# Patient Record
Sex: FEMALE | Race: WHITE
[De-identification: names, ages, dates, MRNs, and addresses within clinical notes are randomized per-mention and may not be internally consistent; named-entity substitution may affect disease eponyms.]

---

## 2021-07-12 ENCOUNTER — HOSPITAL ENCOUNTER (OUTPATIENT)
Dept: HOSPITAL 75 - LAB FS | Age: 31
End: 2021-07-12
Attending: OBSTETRICS & GYNECOLOGY
Payer: MEDICAID

## 2021-07-12 DIAGNOSIS — O20.0: Primary | ICD-10-CM

## 2021-07-12 PROCEDURE — 84702 CHORIONIC GONADOTROPIN TEST: CPT

## 2021-07-12 PROCEDURE — 36415 COLL VENOUS BLD VENIPUNCTURE: CPT

## 2021-07-13 ENCOUNTER — HOSPITAL ENCOUNTER (OUTPATIENT)
Dept: HOSPITAL 75 - RAD | Age: 31
End: 2021-07-13
Attending: OBSTETRICS & GYNECOLOGY
Payer: MEDICAID

## 2021-07-13 DIAGNOSIS — Z3A.08: ICD-10-CM

## 2021-07-13 DIAGNOSIS — O20.9: Primary | ICD-10-CM

## 2021-07-13 PROCEDURE — 76801 OB US < 14 WKS SINGLE FETUS: CPT

## 2021-07-13 PROCEDURE — 76817 TRANSVAGINAL US OBSTETRIC: CPT

## 2021-07-13 NOTE — DIAGNOSTIC IMAGING REPORT
INDICATION: Vaginal bleeding for four days.



FINDINGS: There is an intrauterine pregnancy with fetal pole of

19.8 mm. Gestational age of 8 weeks 4 days. The amniotic sac

appears normal. There is a large heterogeneous subchorionic fluid

collection consistent with hemorrhage. This measures

approximately 5.8 x 2.2 x 5 cm. The maternal adnexa appear

normal. Both ovaries are visualized.



IMPRESSION: Single live intrauterine fetus with current biometric

measurements consistent with 8 week 4 day gestation. Sonographic

EDC of 02/18/2021. This correlates well with LMP. There is a

rather large subchorionic fluid collection present.



Dictated by: 



  Dictated on workstation # [id number]

## 2021-07-14 ENCOUNTER — HOSPITAL ENCOUNTER (EMERGENCY)
Dept: HOSPITAL 75 - ER | Age: 31
Discharge: HOME | End: 2021-07-14
Payer: MEDICAID

## 2021-07-14 VITALS — BODY MASS INDEX: 23.27 KG/M2 | WEIGHT: 128.09 LBS | HEIGHT: 62.01 IN

## 2021-07-14 VITALS — DIASTOLIC BLOOD PRESSURE: 69 MMHG | SYSTOLIC BLOOD PRESSURE: 107 MMHG

## 2021-07-14 DIAGNOSIS — O20.0: Primary | ICD-10-CM

## 2021-07-14 DIAGNOSIS — Z3A.08: ICD-10-CM

## 2021-07-14 LAB
BASOPHILS # BLD AUTO: 0 10^3/UL (ref 0–0.1)
BASOPHILS NFR BLD AUTO: 0 % (ref 0–10)
BUN/CREAT SERPL: 10
CALCIUM SERPL-MCNC: 9.1 MG/DL (ref 8.5–10.1)
CHLORIDE SERPL-SCNC: 104 MMOL/L (ref 98–107)
CO2 SERPL-SCNC: 24 MMOL/L (ref 21–32)
CREAT SERPL-MCNC: 0.83 MG/DL (ref 0.6–1.3)
EOSINOPHIL # BLD AUTO: 0.5 10^3/UL (ref 0–0.3)
EOSINOPHIL NFR BLD AUTO: 4 % (ref 0–10)
GFR SERPLBLD BASED ON 1.73 SQ M-ARVRAT: > 60 ML/MIN
GLUCOSE SERPL-MCNC: 100 MG/DL (ref 70–105)
HCT VFR BLD CALC: 37 % (ref 35–52)
HGB BLD-MCNC: 12.4 G/DL (ref 11.5–16)
LYMPHOCYTES # BLD AUTO: 3.9 10^3/UL (ref 1–4)
LYMPHOCYTES NFR BLD AUTO: 31 % (ref 12–44)
MANUAL DIFFERENTIAL PERFORMED BLD QL: NO
MCH RBC QN AUTO: 30 PG (ref 25–34)
MCHC RBC AUTO-ENTMCNC: 34 G/DL (ref 32–36)
MCV RBC AUTO: 89 FL (ref 80–99)
MONOCYTES # BLD AUTO: 0.8 10^3/UL (ref 0–1)
MONOCYTES NFR BLD AUTO: 6 % (ref 0–12)
NEUTROPHILS # BLD AUTO: 7.3 10^3/UL (ref 1.8–7.8)
NEUTROPHILS NFR BLD AUTO: 59 % (ref 42–75)
PLATELET # BLD: 276 10^3/UL (ref 130–400)
PMV BLD AUTO: 9.7 FL (ref 9–12.2)
POTASSIUM SERPL-SCNC: 3.9 MMOL/L (ref 3.6–5)
SODIUM SERPL-SCNC: 140 MMOL/L (ref 135–145)
WBC # BLD AUTO: 12.5 10^3/UL (ref 4.3–11)

## 2021-07-14 PROCEDURE — 84702 CHORIONIC GONADOTROPIN TEST: CPT

## 2021-07-14 PROCEDURE — 80048 BASIC METABOLIC PNL TOTAL CA: CPT

## 2021-07-14 PROCEDURE — 86900 BLOOD TYPING SEROLOGIC ABO: CPT

## 2021-07-14 PROCEDURE — 85025 COMPLETE CBC W/AUTO DIFF WBC: CPT

## 2021-07-14 PROCEDURE — 86901 BLOOD TYPING SEROLOGIC RH(D): CPT

## 2021-07-14 PROCEDURE — 93041 RHYTHM ECG TRACING: CPT

## 2021-07-14 PROCEDURE — 36415 COLL VENOUS BLD VENIPUNCTURE: CPT

## 2021-07-14 NOTE — ED GU-FEMALE
General


Chief Complaint:  Female Reproductive


Stated Complaint:  VAGINAL BLEEDING/ 8 WEEKS PREGNANT


Source:  patient





History of Present Illness


Date Seen by Provider:  2021


Time Seen by Provider:  20:44


Initial Comments


PT ARRIVES VIA POV FROM HOME IN [place]


PT STATES SHE IS APPROXIMATELY 8 WEEKS PREGNANT WITH LMP 21


HAS NOT SEEN DR. LARIOS FOR THIS PREGNANCY--HAS FIRST APPOINTMENT 21


RX FOR KEFLEX WAS CALLED IN LAST WEEK FOR UTI SYMPTOMS, TOOK FOR 3-4 DAYS, THOSE

SYMPTOMS ARE GONE


PT BEGAN HAVING VAGINAL BLEEDING ON 21, WHILE IN [place], [place] AND WENT 

TO ER THERE. URINE WAS CLEAR AT THAT TIME, SO KEFLEX WAS STOPPED


PT CALLED DR. LARIOS'S OFFICE ON MONDAY AND HAD OUTPATIENT LAB DONE, QUANT BHCG 

WAS [phone], WHICH WAS  HIGHER THAN IT WAS WHEN SHE WAS SEEN IN [place]--WAS [phone] 

ON 21


HAD OUTPATIENT ULTRASOUND DONE YESTERDAY, WHICH SHOWED GEST AGE OF 8 WEEKS, 4 

DAYS ( CONSISTENT WITH LMP) AND LARGE SUBCHORIONIC HEMORRHAGE


STATES BLEEDING HAS CONTINUED TODAY, AND WAS HEAVY EARLIER TODAY, THEN WAS 

SPOTTING, THEN ABOUT AN HOUR AGO, GOT HEAVY AGAIN


HAS BEEN PASSING CLOTS, WITH A COUPLE AS LARGE AS HER PALM


STATES SHE HAS GONE THROUGH 3 BRIEFS TODAY AND ABOUT 10 PADS


EARLIER TODAY, HER BP DROPPED TO 70 SYSTOLIC, THEN WENT BACK UP TO 90/60 WHICH 

IS NORMAL FOR PT


WAS SLIGHTLY DIZZY EARLIER, NOT NOW


HANDS WERE NUMB AND TINGLY EARLIER, NOT NOW


HAS SOME MILD LOWER BACK DISCOMFORT BUT NO PELVIC CRAMPING


NO NAUSEA/VOMITING








PT IS  AB0


PRE-ECLAMPSIA WITH BOTH PREVIOUS PREGNANCIES





OB/GYN: DR. LARIOS





Allergies and Home Medications


Allergies


Coded Allergies:  


     No Known Drug Allergies (Unverified , 21)





Patient Home Medication List


Home Medication List Reviewed:  Yes





Review of Systems


Review of Systems


Constitutional:  see HPI, dizziness


EENTM:  no symptoms reported


Respiratory:  no symptoms reported


Cardiovascular:  no symptoms reported


Gastrointestinal:  no symptoms reported


Genitourinary:  see HPI


Pregnant:  Yes


LMP:  May 13, 2021


Musculoskeletal:  see HPI, back pain


Skin:  no symptoms reported


Psychiatric/Neurological:  See HPI





Past Medical-Social-Family Hx


Past Medical History


Surgeries:  Yes


 Section


Respiratory:  No


Cardiac:  No


Neurological:  No


Pregnant:  Yes


Last Menstrual Period:  May 13, 2021


Reproductive Disorders:  No


Genitourinary:  No


Gastrointestinal:  No


Musculoskeletal:  No


Endocrine:  Yes (HYPOGLYCEMIA)


HEENT:  No


Cancer:  No


Psychosocial:  No


Integumentary:  No


Blood Disorders:  No





Physical Exam


Vital Signs





Vital Signs - First Documented








 21





 20:37


 


Temp 36.0


 


Pulse 93


 


Resp 17


 


B/P (MAP) 107/69 (82)


 


O2 Delivery Room Air





Capillary Refill :


Height, Weight, BMI


Height: '"


Weight: lbs. oz. kg;  BMI


Method:


General Appearance:  WD/WN, no apparent distress


HEENT:  PERRL/EOMI; No pale conjunctivae (R), No pale conjunctivae (L)


Cardiovascular:  regular rate, rhythm, no murmur


Respiratory:  normal breath sounds


Gastrointestinal:  non tender, soft, no organomegaly


Back:  no CVA tenderness


Extremities:  normal inspection, no pedal edema


Neurologic/Psychiatric:  no motor/sensory deficits, alert, normal mood/affect, 

oriented x 3


Skin:  normal color, warm/dry; No pallor





Progress/Results/Core Measures


Suspected Sepsis


SIRS


Temperature: 


Pulse:  


Respiratory Rate: 


 


Laboratory Tests


21 21:02: White Blood Count 12.5H


Blood Pressure  / 


Mean: 


 





Laboratory Tests


21 21:02: 


Creatinine 0.83, Platelet Count 276








Results/Orders


Lab Results





Laboratory Tests








Test


 21


21:02 Range/Units


 


 


White Blood Count


 12.5 H


 4.3-11.0


10^3/uL


 


Red Blood Count


 4.16 


 3.80-5.11


10^6/uL


 


Hemoglobin 12.4  11.5-16.0  g/dL


 


Hematocrit 37  35-52  %


 


Mean Corpuscular Volume 89  80-99  fL


 


Mean Corpuscular Hemoglobin 30  25-34  pg


 


Mean Corpuscular Hemoglobin


Concent 34 


 32-36  g/dL





 


Red Cell Distribution Width 13.2  10.0-14.5  %


 


Platelet Count


 276 


 130-400


10^3/uL


 


Mean Platelet Volume 9.7  9.0-12.2  fL


 


Immature Granulocyte % (Auto) 0   %


 


Neutrophils (%) (Auto) 59  42-75  %


 


Lymphocytes (%) (Auto) 31  12-44  %


 


Monocytes (%) (Auto) 6  0-12  %


 


Eosinophils (%) (Auto) 4  0-10  %


 


Basophils (%) (Auto) 0  0-10  %


 


Neutrophils # (Auto)


 7.3 


 1.8-7.8


10^3/uL


 


Lymphocytes # (Auto)


 3.9 


 1.0-4.0


10^3/uL


 


Monocytes # (Auto)


 0.8 


 0.0-1.0


10^3/uL


 


Eosinophils # (Auto)


 0.5 H


 0.0-0.3


10^3/uL


 


Basophils # (Auto)


 0.0 


 0.0-0.1


10^3/uL


 


Immature Granulocyte # (Auto)


 0.1 


 0.0-0.1


10^3/uL


 


Sodium Level 140  135-145  MMOL/L


 


Potassium Level 3.9  3.6-5.0  MMOL/L


 


Chloride Level 104    MMOL/L


 


Carbon Dioxide Level 24  21-32  MMOL/L


 


Anion Gap 12  5-14  MMOL/L


 


Blood Urea Nitrogen 8  7-18  MG/DL


 


Creatinine


 0.83 


 0.60-1.30


MG/DL


 


Estimat Glomerular Filtration


Rate > 60 


  





 


BUN/Creatinine Ratio 10   


 


Glucose Level 100    MG/DL


 


Calcium Level 9.1  8.5-10.1  MG/DL


 


Human Chorionic Gonadotropin,


Quant 35468 H


 <5  MIU/ML











My Orders





Orders - NUNO UP DO


Basic Metabolic Panel (21 20:41)


Cbc With Automated Diff (21 20:41)


Hcg,Quantitative (21 20:41)


Abo Rh Type (21 20:41)


Ed Iv/Invasive Line Start (21 20:41)


Monitor-Rhythm Ecg Trace Only (21 20:41)


Ed Iv/Invasive Line Start (21 20:41)


Ed Iv/Invasive Line Start (21 20:41)


Ns Iv 1000 Ml (Sodium Chloride 0.9%) (21 20:45)





Vital Signs/I&O











 21





 20:37


 


Temp 36.0


 


Pulse 93


 


Resp 17


 


B/P (MAP) 107/69 (82)


 


O2 Delivery Room Air





Capillary Refill :


Progress Note :  


Progress Note


PT REPORTS BLEEDING HAS SLOWED DOWN AND DID GO THROUGH ANY PADS DURING ER STAY





NO ULTRASOUND AVAILABLE AT THIS HOUR





BLOOD TYPE O+





PT REPORTS HER BLOOD PRESSURE IS NORMALLY 90/60





Diagnostic Imaging





Comments


OB ULTRASOUND DONE ON 21 AS OUTPATIENT:


FINDINGS: There is an intrauterine pregnancy with fetal pole of


19.8 mm. Gestational age of 8 weeks 4 days. The amniotic sac


appears normal. There is a large heterogeneous subchorionic fluid


collection consistent with hemorrhage. This measures


approximately 5.8 x 2.2 x 5 cm. The maternal adnexa appear


normal. Both ovaries are visualized.





IMPRESSION: Single live intrauterine fetus with current biometric


measurements consistent with 8 week 4 day gestation. Sonographic


EDC of 2021. This correlates well with LMP. There is a


rather large subchorionic fluid collection present.


   Reviewed:  Reviewed by Me





Departure


Impression





   Primary Impression:  


   Threatened  in first trimester


   Additional Impression:  


   Subchorionic hemorrhage in first trimester


Disposition:   HOME, SELF-CARE


Condition:  Stable





Departure-Patient Inst.


Decision time for Depature:  21:52


Referrals:  


MANN LARIOS PANKAJ K MD (PCP/Family)


Primary Care Physician


Patient Instructions:  Bleeding in Early Pregnancy ED, Subchorionic Bleeding, 

Threatened Miscarriage (DC)





Add. Discharge Instructions:  


NOTHING IN VAGINA-NO TAMPONS, DOUCHING OR INTERCOURSE





INCREASE YOUR FLUID INTAKE--DRINK ENOUGH SO YOU ARE URINATING EVERY 2-3 HOURS 

WHILE AWAKE





FOLLOW UP WITH DR. LARIOS THIS WEEK FOR FURTHER CARE--CALL IN AM TO SCHEDULE 

APPOINTMENT





RETURN TO ER IF SYMPTOMS WORSEN





All discharge instructions reviewed with patient and/or family. Voiced 

understanding.











NUNO UP DO                 2021 20:54

## 2021-08-04 ENCOUNTER — HOSPITAL ENCOUNTER (EMERGENCY)
Dept: HOSPITAL 75 - ER | Age: 31
Discharge: HOME | End: 2021-08-04
Payer: MEDICAID

## 2021-08-04 VITALS — BODY MASS INDEX: 23.99 KG/M2 | HEIGHT: 62.01 IN | WEIGHT: 132.06 LBS

## 2021-08-04 VITALS — SYSTOLIC BLOOD PRESSURE: 97 MMHG | DIASTOLIC BLOOD PRESSURE: 55 MMHG

## 2021-08-04 DIAGNOSIS — Z87.891: ICD-10-CM

## 2021-08-04 DIAGNOSIS — Z3A.11: ICD-10-CM

## 2021-08-04 DIAGNOSIS — O03.4: Primary | ICD-10-CM

## 2021-08-04 LAB
ALBUMIN SERPL-MCNC: 3.6 GM/DL (ref 3.2–4.5)
ALP SERPL-CCNC: 70 U/L (ref 40–136)
ALT SERPL-CCNC: 15 U/L (ref 0–55)
APTT PPP: (no result) S
BACTERIA #/AREA URNS HPF: NEGATIVE /HPF
BASOPHILS # BLD AUTO: 0 10^3/UL (ref 0–0.1)
BASOPHILS NFR BLD AUTO: 0 % (ref 0–10)
BILIRUB SERPL-MCNC: 0.2 MG/DL (ref 0.1–1)
BILIRUB UR QL STRIP: NEGATIVE
BUN/CREAT SERPL: 11
CALCIUM SERPL-MCNC: 8.6 MG/DL (ref 8.5–10.1)
CHLORIDE SERPL-SCNC: 104 MMOL/L (ref 98–107)
CO2 SERPL-SCNC: 22 MMOL/L (ref 21–32)
CREAT SERPL-MCNC: 0.56 MG/DL (ref 0.6–1.3)
EOSINOPHIL # BLD AUTO: 0.1 10^3/UL (ref 0–0.3)
EOSINOPHIL NFR BLD AUTO: 1 % (ref 0–10)
EOSINOPHIL NFR BLD MANUAL: 1 %
FIBRINOGEN PPP-MCNC: CLEAR MG/DL
GFR SERPLBLD BASED ON 1.73 SQ M-ARVRAT: 126 ML/MIN
GLUCOSE SERPL-MCNC: 92 MG/DL (ref 70–105)
GLUCOSE UR STRIP-MCNC: NEGATIVE MG/DL
HCT VFR BLD CALC: 38 % (ref 35–52)
HGB BLD-MCNC: 12.6 G/DL (ref 11.5–16)
KETONES UR QL STRIP: (no result)
LEUKOCYTE ESTERASE UR QL STRIP: (no result)
LYMPHOCYTES # BLD AUTO: 1.6 10^3/UL (ref 1–4)
LYMPHOCYTES NFR BLD AUTO: 9 % (ref 12–44)
MANUAL DIFFERENTIAL PERFORMED BLD QL: YES
MCH RBC QN AUTO: 29 PG (ref 25–34)
MCHC RBC AUTO-ENTMCNC: 33 G/DL (ref 32–36)
MCV RBC AUTO: 88 FL (ref 80–99)
MONOCYTES # BLD AUTO: 0.8 10^3/UL (ref 0–1)
MONOCYTES NFR BLD AUTO: 5 % (ref 0–12)
MONOCYTES NFR BLD: 4 %
NEUTROPHILS # BLD AUTO: 15.5 10^3/UL (ref 1.8–7.8)
NEUTROPHILS NFR BLD AUTO: 85 % (ref 42–75)
NEUTS BAND NFR BLD MANUAL: 90 %
NITRITE UR QL STRIP: POSITIVE
PH UR STRIP: 7 [PH] (ref 5–9)
PLATELET # BLD: 283 10^3/UL (ref 130–400)
PMV BLD AUTO: 9.7 FL (ref 9–12.2)
POTASSIUM SERPL-SCNC: 3.9 MMOL/L (ref 3.6–5)
PROT SERPL-MCNC: 6.6 GM/DL (ref 6.4–8.2)
PROT UR QL STRIP: (no result)
RBC #/AREA URNS HPF: >100 /HPF
RBC MORPH BLD: NORMAL
RENAL EPI CELLS #/AREA URNS HPF: (no result) /HPF
SODIUM SERPL-SCNC: 137 MMOL/L (ref 135–145)
SP GR UR STRIP: 1.02 (ref 1.02–1.02)
SQUAMOUS #/AREA URNS HPF: (no result) /HPF
VARIANT LYMPHS NFR BLD MANUAL: 5 %
WBC # BLD AUTO: 18.2 10^3/UL (ref 4.3–11)
WBC #/AREA URNS HPF: >100 /HPF

## 2021-08-04 PROCEDURE — 36415 COLL VENOUS BLD VENIPUNCTURE: CPT

## 2021-08-04 PROCEDURE — 84703 CHORIONIC GONADOTROPIN ASSAY: CPT

## 2021-08-04 PROCEDURE — 87088 URINE BACTERIA CULTURE: CPT

## 2021-08-04 PROCEDURE — 84702 CHORIONIC GONADOTROPIN TEST: CPT

## 2021-08-04 PROCEDURE — 85007 BL SMEAR W/DIFF WBC COUNT: CPT

## 2021-08-04 PROCEDURE — 81000 URINALYSIS NONAUTO W/SCOPE: CPT

## 2021-08-04 PROCEDURE — 80053 COMPREHEN METABOLIC PANEL: CPT

## 2021-08-04 PROCEDURE — 85027 COMPLETE CBC AUTOMATED: CPT

## 2021-08-04 NOTE — ED GU-FEMALE
General


Chief Complaint:  Female Reproductive


Stated Complaint:  VAGINAL BLEEDING;12 WKS PREGNANT


Source:  patient


Exam Limitations:  no limitations





History of Present Illness


Date Seen by Provider:  Aug 4, 2021


Time Seen by Provider:  08:30


Initial Comments


Patient to the ER by private conveyance from home with her significant other and

chief complaint that she is having vaginal bleeding for the past several days 

with blood clots greater than the chickens egg size.  She denies any chest pain 

or shortness of air.  She is having some cramping intermittent abdominal/low 

pelvic pain.  She is a G3, P2 at 11 weeks and 6 days followed by Dr. Larios.  

She had some bleeding 2 to 3 weeks ago and an ultrasound demonstrated sub

chorionic hemorrhage.  Repeat ultrasound at Dr. Larios's clinic showed 

resolution of said hematoma and she had an increasing hCG at that time. 


LMP 2021 which puts her at 11 weeks and 6 days.  She has a history of 2 C-

sections with history of one child was breech and the other child she had a mini

stroke related to preeclampsia.





Allergies and Home Medications


Allergies


Coded Allergies:  


     No Known Drug Allergies (Unverified , 21)





Patient Home Medication List


Home Medication List Reviewed:  Yes





Review of Systems


Review of Systems


Constitutional:  No chills, No fever


EENTM:  No ear discharge, No ear pain


Cardiovascular:  No chest pain, No palpitations


Gastrointestinal:  abdominal pain; No nausea, No vomiting


Genitourinary:  denies discharge, denies dysuria


Musculoskeletal:  No back pain, No joint pain





All Other Systemes Reviewed


Negative Unless Noted:  Yes





Past Medical-Social-Family Hx


Patient Social History


Tobacco Use?:  No


Smoking Status:  Former Smoker


Substance use?:  No


Alcohol Use?:  No


Pt feels they are or have been:  No





Immunizations Up To Date


First/Initial COVID19 Vaccinat:  2021


Second COVID19 Vaccination [place]:  2021


COVID19 Vaccine :  MODERNA





Past Medical History


Surgeries:  Yes


 Section


Respiratory:  No


Cardiac:  No


Neurological:  No


Reproductive Disorders:  No


Genitourinary:  No


Gastrointestinal:  No


Musculoskeletal:  No


Endocrine:  Yes (HYPOGLYCEMIA)


HEENT:  No


Cancer:  No


Psychosocial:  No


Integumentary:  No


Blood Disorders:  No





Physical Exam


Vital Signs





Vital Signs - First Documented








 21





 08:32


 


Temp 36.3


 


Pulse 67


 


Resp 20


 


B/P (MAP) 103/57 (72)


 


Pulse Ox 99


 


O2 Delivery Room Air





Capillary Refill :


Height, Weight, BMI


Height: '"


Weight: lbs. oz. kg; 23.00 BMI


Method:


General Appearance:  WD/WN, mild distress


HEENT:  PERRL/EOMI, pharynx normal


Neck:  full range of motion, normal inspection


Cardiovascular:  normal peripheral pulses, regular rate, rhythm


Respiratory:  normal breath sounds, no respiratory distress, no accessory muscle

use


Gastrointestinal:  normal bowel sounds, soft


Extremities:  normal inspection, normal capillary refill


Neurologic/Psychiatric:  alert, normal mood/affect, oriented x 3


Skin:  normal color, warm/dry





Progress/Results/Core Measures


Suspected Sepsis


SIRS


Temperature: 


Pulse:  


Respiratory Rate: 


 


Laboratory Tests


21 08:55: White Blood Count 18.2H


Blood Pressure  / 


Mean: 


 





Laboratory Tests


21 08:55: 


Creatinine 0.56L, Platelet Count 283, Total Bilirubin 0.2








Results/Orders


Lab Results





Laboratory Tests








Test


 21


08:55 21


09:19 Range/Units


 


 


White Blood Count


 18.2 H


 


 4.3-11.0


10^3/uL


 


Red Blood Count


 4.30 


 


 3.80-5.11


10^6/uL


 


Hemoglobin 12.6   11.5-16.0  g/dL


 


Hematocrit 38   35-52  %


 


Mean Corpuscular Volume 88   80-99  fL


 


Mean Corpuscular Hemoglobin 29   25-34  pg


 


Mean Corpuscular Hemoglobin


Concent 33 


 


 32-36  g/dL





 


Red Cell Distribution Width 13.2   10.0-14.5  %


 


Platelet Count


 283 


 


 130-400


10^3/uL


 


Mean Platelet Volume 9.7   9.0-12.2  fL


 


Immature Granulocyte % (Auto) 1    %


 


Neutrophils (%) (Auto) 85 H  42-75  %


 


Lymphocytes (%) (Auto) 9 L  12-44  %


 


Monocytes (%) (Auto) 5   0-12  %


 


Eosinophils (%) (Auto) 1   0-10  %


 


Basophils (%) (Auto) 0   0-10  %


 


Neutrophils # (Auto)


 15.5 H


 


 1.8-7.8


10^3/uL


 


Lymphocytes # (Auto)


 1.6 


 


 1.0-4.0


10^3/uL


 


Monocytes # (Auto)


 0.8 


 


 0.0-1.0


10^3/uL


 


Eosinophils # (Auto)


 0.1 


 


 0.0-0.3


10^3/uL


 


Basophils # (Auto)


 0.0 


 


 0.0-0.1


10^3/uL


 


Immature Granulocyte # (Auto)


 0.1 


 


 0.0-0.1


10^3/uL


 


Neutrophils % (Manual) 90    %


 


Lymphocytes % (Manual) 5    %


 


Monocytes % (Manual) 4    %


 


Eosinophils % (Manual) 1    %


 


Band Neutrophils     %


 


Blood Morphology Comment NORMAL    


 


Sodium Level 137   135-145  MMOL/L


 


Potassium Level 3.9   3.6-5.0  MMOL/L


 


Chloride Level 104     MMOL/L


 


Carbon Dioxide Level 22   21-32  MMOL/L


 


Anion Gap 11   5-14  MMOL/L


 


Blood Urea Nitrogen 6 L  7-18  MG/DL


 


Creatinine


 0.56 L


 


 0.60-1.30


MG/DL


 


Estimat Glomerular Filtration


Rate 126 


 


  





 


BUN/Creatinine Ratio 11    


 


Glucose Level 92     MG/DL


 


Calcium Level 8.6   8.5-10.1  MG/DL


 


Corrected Calcium 8.9   8.5-10.1  MG/DL


 


Total Bilirubin 0.2   0.1-1.0  MG/DL


 


Aspartate Amino Transf


(AST/SGOT) 13 


 


 5-34  U/L





 


Alanine Aminotransferase


(ALT/SGPT) 15 


 


 0-55  U/L





 


Alkaline Phosphatase 70     U/L


 


Total Protein 6.6   6.4-8.2  GM/DL


 


Albumin 3.6   3.2-4.5  GM/DL


 


Urine Color  RED H  


 


Urine Clarity  CLEAR   


 


Urine pH  7.0  5-9  


 


Urine Specific Gravity  1.020  1.016-1.022  


 


Urine Protein  2+ H NEGATIVE  


 


Urine Glucose (UA)  NEGATIVE  NEGATIVE  


 


Urine Ketones  TRACE H NEGATIVE  


 


Urine Nitrite  POSITIVE H NEGATIVE  


 


Urine Bilirubin  NEGATIVE  NEGATIVE  


 


Urine Urobilinogen  2.0  < = 1.0  MG/DL


 


Urine Leukocyte Esterase  TRACE H NEGATIVE  


 


Urine RBC (Auto)  3+ H NEGATIVE  


 


Urine RBC  >100 H  /HPF


 


Urine WBC  >100 H  /HPF


 


Urine Squamous Epithelial


Cells 


 0-2 


  /HPF





 


Urine Renal Epithelial Cells  NONE   /HPF


 


Urine Crystals  NONE   /LPF


 


Urine Bacteria  NEGATIVE   /HPF


 


Urine Casts  NONE   /LPF


 


Urine Mucus  NEGATIVE   /LPF


 


Urine Culture Indicated  YES   








My Orders





Orders - DIANE CROOKS


Cbc With Automated Diff (21 08:56)


Comprehensive Metabolic Panel (21 08:56)


Ua Culture If Indicated (21 08:56)


Urine Pregnancy Bedside (21 08:56)


Manual Differential (21 08:55)


Urine Culture (21 09:19)





Vital Signs/I&O











 21





 08:32


 


Temp 36.3


 


Pulse 67


 


Resp 20


 


B/P (MAP) 103/57 (72)


 


Pulse Ox 99


 


O2 Delivery Room Air





Capillary Refill :


Progress Note #1:  


   Time:  08:50


Progress Note


When the nurse help the patient into her down she had the products of conception

including an amniotic sac which appeared to be intact in the vagina which easily

delivered.  Suspect placenta should shortly follow.  Patient is still having 

some cramping.  Will do a manual exam with speculum.  We will check some labs to

make sure her hemoglobin is not significantly dropped.


Progress Note #2:  


   Time:  09:26


Progress Note


Hemoglobin is 12 and she is O+.


Discussed the case with Dr. Larios and he agrees with expectant management if 

her hemoglobin is normal and her vitals are okay.  He would recommend follow-up 

in the next 1 to 2 weeks to make sure the hCG is appropriately going down.


Patient states she has an appointment on the , 2 weeks from now





Departure


Impression





   Primary Impression:  


   Incomplete miscarriage


Disposition:   HOME, SELF-CARE


Condition:  Stable





Departure-Patient Inst.


Decision time for Depature:  10:04


Referrals:  


MANN LARIOS PANKAJ K MD (PCP/Family)


Primary Care Physician


Patient Instructions:  Miscarriage (DC)





Add. Discharge Instructions:  


Drink plenty of fluids.


Nothing in the vagina until cleared by your obstetrician.


Follow-up with Dr. Larios at your follow-up appointment.


Return to the ER promptly if you experience chest pain, shortness of air or 

other worrisome symptoms.


Tylenol 1000 mg every 8 hours as necessary for pain.


Ibuprofen 800 mg every 8 hours necessary for cramping pain.


Hydrocodone 1 tablet every 6 hours as necessary for pain.


All discharge instructions reviewed with patient and/or family. Voiced 

understanding.


Scripts


Hydrocodone/Acetaminophen (Hydrocodone-Acetamin 5-325 mg) 1 Each Tablet


1 TAB PO Q6H PRN for PAIN-MODERATE (5-7), #15 TAB 0 Refills


   Prov: DIANE CROOKS         21


Work/School Note:  Work Release Form   Date Seen in the Emergency Department:  

Aug 4, 2021


   Return to Work:  Aug 9, 2021


   Restrictions:  No Restrictions





Copy


Copies To 1:   MANN LARIOS TITUS J                  Aug 4, 2021 08:55

## 2021-08-26 ENCOUNTER — HOSPITAL ENCOUNTER (OUTPATIENT)
Dept: HOSPITAL 75 - LAB | Age: 31
End: 2021-08-26
Attending: OBSTETRICS & GYNECOLOGY
Payer: MEDICAID

## 2021-08-26 DIAGNOSIS — O03.9: Primary | ICD-10-CM

## 2021-08-26 PROCEDURE — 36415 COLL VENOUS BLD VENIPUNCTURE: CPT

## 2021-08-26 PROCEDURE — 84702 CHORIONIC GONADOTROPIN TEST: CPT

## 2021-09-03 ENCOUNTER — HOSPITAL ENCOUNTER (OUTPATIENT)
Dept: HOSPITAL 75 - LAB FS | Age: 31
End: 2021-09-03
Attending: OBSTETRICS & GYNECOLOGY
Payer: MEDICAID

## 2021-09-03 DIAGNOSIS — O03.9: Primary | ICD-10-CM

## 2021-09-03 PROCEDURE — 36415 COLL VENOUS BLD VENIPUNCTURE: CPT

## 2021-09-03 PROCEDURE — 84702 CHORIONIC GONADOTROPIN TEST: CPT
